# Patient Record
Sex: MALE | URBAN - METROPOLITAN AREA
[De-identification: names, ages, dates, MRNs, and addresses within clinical notes are randomized per-mention and may not be internally consistent; named-entity substitution may affect disease eponyms.]

---
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
## 2025-06-15 ENCOUNTER — NURSE TRIAGE (OUTPATIENT)
Dept: NURSING | Facility: CLINIC | Age: 20
End: 2025-06-15
Payer: COMMERCIAL

## 2025-06-15 NOTE — TELEPHONE ENCOUNTER
Tank calling to see if he can change his HRT medications, he is [facility]. He denies symptoms or side effects and states he is well, declined triage.  He reports he has an online appt with [facility] Friday but would call back if he wanted an in-person appt.  Anjelica Arreguin RN on 6/15/2025 at 12:16 PM      Reason for Disposition   Medication questions   [1] Caller has NON-URGENT medicine question about med that PCP prescribed AND [2] triager unable to answer question    Additional Information   Negative: [1] Intentional drug overdose AND [2] suicidal thoughts or ideas    Protocols used: Information Only Call - No Triage-A-, Medication Question Call-A-AH